# Patient Record
Sex: MALE | Race: BLACK OR AFRICAN AMERICAN
[De-identification: names, ages, dates, MRNs, and addresses within clinical notes are randomized per-mention and may not be internally consistent; named-entity substitution may affect disease eponyms.]

---

## 2021-05-01 ENCOUNTER — HOSPITAL ENCOUNTER (EMERGENCY)
Dept: HOSPITAL 35 - ER | Age: 61
LOS: 1 days | Discharge: HOME | End: 2021-05-02
Payer: COMMERCIAL

## 2021-05-01 VITALS — WEIGHT: 300.01 LBS | BODY MASS INDEX: 42.95 KG/M2 | HEIGHT: 70 IN

## 2021-05-01 DIAGNOSIS — F16.921: Primary | ICD-10-CM

## 2021-05-01 DIAGNOSIS — F17.210: ICD-10-CM

## 2021-05-01 DIAGNOSIS — I10: ICD-10-CM

## 2021-05-01 DIAGNOSIS — F19.10: ICD-10-CM

## 2021-05-01 LAB
ALBUMIN SERPL-MCNC: 3.1 G/DL (ref 3.4–5)
ALT SERPL-CCNC: 20 U/L (ref 16–63)
ANION GAP SERPL CALC-SCNC: 8 MMOL/L (ref 7–16)
AST SERPL-CCNC: 31 U/L (ref 15–37)
BASOPHILS NFR BLD AUTO: 0.5 % (ref 0–2)
BILIRUB SERPL-MCNC: 0.9 MG/DL (ref 0.2–1)
BUN SERPL-MCNC: 9 MG/DL (ref 7–18)
CALCIUM SERPL-MCNC: 8.6 MG/DL (ref 8.5–10.1)
CHLORIDE SERPL-SCNC: 105 MMOL/L (ref 98–107)
CO2 SERPL-SCNC: 25 MMOL/L (ref 21–32)
CREAT SERPL-MCNC: 0.9 MG/DL (ref 0.7–1.3)
EOSINOPHIL NFR BLD: 4.8 % (ref 0–3)
ERYTHROCYTE [DISTWIDTH] IN BLOOD BY AUTOMATED COUNT: 14.5 % (ref 10.5–14.5)
GLUCOSE SERPL-MCNC: 92 MG/DL (ref 74–106)
GRANULOCYTES NFR BLD MANUAL: 77.1 % (ref 36–66)
HCT VFR BLD CALC: 49.6 % (ref 42–52)
HGB BLD-MCNC: 17 GM/DL (ref 14–18)
LIPASE: 72 U/L (ref 73–393)
LYMPHOCYTES NFR BLD AUTO: 10 % (ref 24–44)
MCH RBC QN AUTO: 31.8 PG (ref 26–34)
MCHC RBC AUTO-ENTMCNC: 34.3 G/DL (ref 28–37)
MCV RBC: 92.8 FL (ref 80–100)
MONOCYTES NFR BLD: 7.6 % (ref 1–8)
NEUTROPHILS # BLD: 4.8 THOU/UL (ref 1.4–8.2)
PLATELET # BLD: 189 THOU/UL (ref 150–400)
POTASSIUM SERPL-SCNC: 5.5 MMOL/L (ref 3.5–5.1)
PROT SERPL-MCNC: 7.5 G/DL (ref 6.4–8.2)
RBC # BLD AUTO: 5.35 MIL/UL (ref 4.5–6)
SODIUM SERPL-SCNC: 138 MMOL/L (ref 136–145)
TROPONIN I SERPL-MCNC: <0.06 NG/ML (ref ?–0.06)
WBC # BLD AUTO: 6.2 THOU/UL (ref 4–11)

## 2021-05-02 VITALS — DIASTOLIC BLOOD PRESSURE: 92 MMHG | SYSTOLIC BLOOD PRESSURE: 158 MMHG

## 2021-05-02 LAB
BILIRUB UR-MCNC: NEGATIVE MG/DL
COLOR UR: YELLOW
KETONES UR STRIP-MCNC: NEGATIVE MG/DL
PCP: POSITIVE
RBC # UR STRIP: NEGATIVE /UL
SP GR UR STRIP: 1.02 (ref 1–1.03)
URINE CLARITY: CLEAR
URINE GLUCOSE-RANDOM*: NEGATIVE
URINE LEUKOCYTES-REFLEX: NEGATIVE
URINE NITRITE-REFLEX: NEGATIVE
URINE PROTEIN (DIPSTICK): (no result)
UROBILINOGEN UR STRIP-ACNC: >= 8 E.U./DL (ref 0.2–1)

## 2021-05-03 NOTE — EKG
[place] [address] [email or web address]  [address]
Phone:  [phone]                    ELECTROCARDIOGRAM REPORT      
_______________________________________________________________________________
 
Name:       SANTANA LOPEZ            [facility] #:                     [id number]     Account #:      [id number]  
Admission:  21    Attend Phys:                          
Discharge:  21    Date of Birth:  [birth year]  
                                                          Report #: [id number]
   [id number]
_______________________________________________________________________________
                         [facility] ED
                                       
Test Date:    2021               Test Time:    20:23:22
Pat Name:     SANTANA LOPEZ             Department:   
Patient ID:   [id number]            Room:          
Gender:       M                        Technician:   quita
:          1960               Requested By: Kami Hayden
Order Number: [id number] MD:   Alex Troy
                                 Measurements
Intervals                              Axis          
Rate:         93                       P:            67
NJ:           136                      QRS:          25
QRSD:         93                       T:            46
QT:           361                                    
QTc:          449                                    
                           Interpretive Statements
Sinus rhythm
Probable left atrial enlargement
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
No previous ECG available for comparison
Electronically Signed On 5-3-2021 7:05:43 CDT by Alex Troy
[email or web address]
 
 
 
 
 
 
 
 
 
 
 
 
 
 
 
 
 
 
 
 
 
  <ELECTRONICALLY SIGNED>
   By: Alex Troy MD, [facility]    
  21
D: 21                           _____________________________________
T: 21                           Alex Troy MD, FACC      /EPI